# Patient Record
(demographics unavailable — no encounter records)

---

## 2024-11-26 NOTE — DISCUSSION/SUMMARY
[FreeTextEntry1] : 1.  Start hydrochlorothiazide 12.5 mg daily given his elevated blood pressure and edema.  Blood work in 2 weeks to ensure that his renal function is stable.  Continue Lasix at 20 mg every day.  2. Continue other current cardiac meds in doses as noted above for hypertension, atrial fibrillation, CHF, pulmonary hypertension, elevated [place] risk.   3. Continue anticoagulation for stroke prophylaxis for atrial fibrillation. 4.  No additional cardiac testing at this time. 5. Monitor BP at home, keep a log and bring to f/u. 6. Patient encouraged to work on diet to lose weight. Patient encouraged to work on a healthy diet high in lean protein, whole grains and vegetables, and lower in white flour and simple sugars. 7. Patient is encouraged to exercise at least 30 minutes a day everyday of the week. 8. Follow up with pulmonary. 9. We will not consider right and left heart catheterization as was planned to evaluate his shortness of breath and pulmonary hypertension as well as his CHF, given his preference to not do so.  10. Follow up here in 3 months. [EKG obtained to assist in diagnosis and management of assessed problem(s)] : EKG obtained to assist in diagnosis and management of assessed problem(s)

## 2024-11-26 NOTE — HISTORY OF PRESENT ILLNESS
[FreeTextEntry1] : Patient presents back to the office today concerned about higher blood pressures.  After stopping the hydrochlorothiazide and making other changes to his medications his blood pressures have been higher.  Recently ranging in the 130s to 140s over 70s to 80s.  He was seen by his nephrologist who told him to stop hydrochlorothiazide and at that time his blood pressure was low so he was actually told to lower his metoprolol.  His blood pressure started going up again so he increased back to 100 mg daily.  He also notes that his swelling has been a little worse since stopping hydrochlorothiazide.  He is not having issues with shortness of breath or other symptoms at all.  Patient denies chest pain, palpitations, orthopnea, presyncope, syncope.

## 2024-11-26 NOTE — ASSESSMENT
[FreeTextEntry1] : Echocardiogram April 22, 2019 that showed left ventricle normal size and function with ejection fraction of 55%. Mild concentric LVH. Moderately enlarged right ventricle with normal function. Mildly dilated left atrium. Mild mitral and moderate tricuspid regurgitation. Moderate pulmonary hypertension. Compared to prior study pulmonary pressures appear higher and right ventricle appears more dilated.  Carotid Doppler October 1, 2020 demonstrated mild plaque bilaterally with mild bilateral stenosis.  Echocardiogram June 8, 2021 demonstrated left ventricle borderline increased in size with normal function with ejection fraction of 55 to 60%. Mildly dilated left atrium, moderately dilated right atrium. Mild mitral and aortic regurgitation with moderate tricuspid regurgitation and trace pulmonic insufficiency. Mild dilatation of the ascending aorta. Mildly elevated pulmonary pressures. LV wall thickness is normal on this study.  Holter monitor June 11, 2021 showed a presenting rhythm atrial fibrillation with an average heart rate of 45 bpm, maximum 54, minimum 37 bpm. Longest pause noted was 2.9 seconds.  Echocardiogram May 17, 2022 demonstrated left ventricle normal size and function with ejection fraction of 60%. Moderately enlarged right ventricle with normal function. Mildly dilated left atrium with severely dilated right atrium. Mild mitral, moderate aortic, moderate tricuspid and trace pulmonic insufficiency. Mild dilatation of the ascending aorta. Pulmonary pressures appear normal on this study.  Nuclear stress test July 13, 2022 during which the patient initially exercised via a Yonathan protocol for 3 minutes and 10 seconds, totaling 4 METS. Peak heart rate achieved was 81 bpm, representing 57% of age-predicted maximum. Blood pressure response to exercise was normal. Given submaximal exercise achieved the study was converted to a pharmacologic study which was tolerated well. Blood pressure response to infusion was normal. EKG showed no evidence of ischemia. Evaluation of nuclear imaging showed a small fixed defect in the basal inferior wall concerning for possible infarct. No evidence of ischemia. Ejection fraction of 58%. Right ventricle is dilated.  Echocardiogram February 26, 2024 demonstrated left ventricle mildly dilated with function with ejection fraction of 55 to 60%.  Mildly enlarged right ventricle with normal function.  Mild left atrial dilatation, right atrium is moderately dilated.  Mild mitral, mild to moderate aortic, mild tricuspid regurgitation.  Ascending order mildly dilated to 4.3 cm.  Pulmonary pressures appear normal on the study.  EKG: Atrial fibrillation with ventricular pacing.  81-year-old man with a past medical history of chronic diastolic congestive heart failure with pulmonary hypertension, chronic atrial fibrillation status post pacemaker, hypertension, PATIENCE not treated who presents today for followup.  Patient presents back concerned about some higher blood pressures.  His blood pressure certainly a little bit higher than it was now off hydrochlorothiazide even with the increase in metoprolol and changes to his medication.  He does also have a little bit more edema.  I would like to try him on a lower dose of hydrochlorothiazide to see if he can tolerate it from his renal function.  He will have blood work in a couple of weeks to check that.  I would not make any other changes to his medication for today.  Interrogation of device shows atrial fibrillation with 100% ventricular pacing.  His EKG shows that as well.  He does not otherwise appear to be in acute heart failure.

## 2024-11-26 NOTE — PHYSICAL EXAM
[General Appearance - In No Acute Distress] : no acute distress [Normal Conjunctiva] : the conjunctiva exhibited no abnormalities [Normal Oral Mucosa] : normal oral mucosa [Normal Oropharynx] : normal oropharynx [Auscultation Breath Sounds / Voice Sounds] : lungs were clear to auscultation bilaterally [Normal Rate] : normal [Rhythm Regular] : regular [Normal S1] : normal S1 [Normal S2] : normal S2 [No Murmur] : no murmurs heard [Abdomen Soft] : soft [Abdomen Tenderness] : non-tender [Abnormal Walk] : normal gait [Nail Clubbing] : no clubbing of the fingernails [Cyanosis, Localized] : no localized cyanosis [Skin Color & Pigmentation] : normal skin color and pigmentation [Oriented To Time, Place, And Person] : oriented to person, place, and time [Affect] : the affect was normal [S3] : no S3 [FreeTextEntry1] : 1+ b/l pretibial edema

## 2024-12-18 NOTE — PHYSICAL EXAM
[Ankle Swelling (On Exam)] : present [Ankle Swelling Bilaterally] : bilaterally  [Ankle Swelling On The Left] : moderate [1+] : left foot dorsalis pedis 1+ [No Joint Swelling] : no joint swelling [] : normal gait [Normal Foot/Ankle] : Both lower extremities were exposed and visualized. Standing exam demonstrates normal foot posture and alignment. Hindfoot exam shows no hindfoot valgus or varus [Diminished Throughout Right Foot] : diminished sensation with monofilament testing throughout right foot [Diminished Throughout Left Foot] : diminished sensation with monofilament testing throughout left foot [Oriented To Time, Place, And Person] : oriented to person, place, and time [Impaired Insight] : insight and judgment were intact [Affect] : the affect was normal [Delayed in the Right Toes] : capillary refills normal in right toes [Delayed in the Left Toes] : capillary refills normal in the left toes [FreeTextEntry1] : Left hallux medial lateral nail border incurvation noted.  Edema and local erythema noted Left.    Nails 1-5 left right painful, thickened, discolored, dystrophic with subungual debris Right toenails 1-5 mildly incurvating without paroncyhia No open ulcerations or breaks in the integument bilateral

## 2024-12-18 NOTE — HISTORY OF PRESENT ILLNESS
[Sneakers] : rachel [FreeTextEntry1] : Patient complains of elongated ingrowing toenails difficult to cut on his own. He denies further concerns. Patient is on Eliquis. Patient also reports abnormal sensation to his feet such as numbness, intermittently and when he elevates his feet.  Patient complains of gout on right foot, took steroids for 3-6 days that was prescribed by PCP. Patient has improved with gout pain.   Update 12/18 Patient is complaining of ingrown toenail on his left big toenail. Patient states the pain is unbearable it hard to wear closed shoes. Patient states his appointment was next month due to being in so much pain he came in today. Patient would like Left PPNA procedure today.

## 2024-12-18 NOTE — REASON FOR VISIT
[Follow-Up Visit] : a follow-up visit for [Ingrown Nail] : ingrown nail [FreeTextEntry2] : left hallux med lat

## 2024-12-18 NOTE — ASSESSMENT
[FreeTextEntry1] : Discussed diagnosis and treatment with patient  Discussed etiology of symptoms patient is experiencing  s/p 9/30/24 Aseptic debridement of nails 1-5 bilateral reducing the length with a sterile nail clipper manually and reduced girth by power toby  Continue antifungal topical apply to bilateral feet twice a day  Discussed proper shoe gear with patient  Discussed the importance of alternating shoe gear every other day  Discussed the importance of appropriate moisture balance  -- 12/18/24 Discussed all risks, complications and benefits for procedure  Consent signed, in chart: Left hallux medial lateral partial nail avulsion with phenol matrixectomy  Skin and nail fold prepped with alcohol and the Left 1st digit local anesthesia administered with 1% plain lidocaine 5 cc  Partial nail avulsion performed with sterile English Anvil and hemostat  Sterile curette used at the base of the nail matrix Nail border was then flushed with normal saline  Phenol applied to the nail matrix and flushed with alcohol/wound cleanser  Dressed with SSD, DSD, and coban. Discussed daily wound care.  Keep dressing clean, dry and intact for next 48 hours, then apply antibiotic ointment for 1 week, and dry bandage daily  Discussed all signs and symptoms of local infection. Patient instructed to return to the office as soon as signs and symptoms arise.  Advised to elevate the left foot and stop blood thinners if bleeding occurs, and inform the office for urgent follow up. Patient verbalized understanding Patient to return to the office in 2 weeks for f/u L PPNA and routine

## 2025-01-03 NOTE — REASON FOR VISIT
[Follow-Up Visit] : a follow-up visit for [Ingrown Nail] : ingrown nail [FreeTextEntry2] : f/u left hallux med lat 10-Nov-2020 17:29

## 2025-01-03 NOTE — ASSESSMENT
[FreeTextEntry1] : Discussed diagnosis and treatment with patient  Discussed etiology of symptoms patient is experiencing  s/p 1/3/25 Aseptic debridement of nails 1-5 bilateral reducing the length with a sterile nail clipper manually and reduced girth by power toby  Continue antifungal topical apply to bilateral feet twice a day  Discussed proper shoe gear with patient  Discussed the importance of alternating shoe gear every other day  Discussed the importance of appropriate moisture balance  -- s/p Left hallux medial lateral partial nail avulsion with phenol matrixectomy 12/18/24 Healing slowly with persistent edema/erythema. No purulence. Applied betadine and bandaid--to be done daily. Continue daily warm foot soaks with Epsom salt and daily betadine Rx Cefdinir 300 mg po BID for 10 days Discussed all signs and symptoms of local infection. Patient instructed to return to the office as soon as signs and symptoms arise.  Patient to return to the office in 2 weeks for f/u L PPNA

## 2025-01-03 NOTE — PHYSICAL EXAM
[Ankle Swelling (On Exam)] : present [Ankle Swelling Bilaterally] : bilaterally  [Ankle Swelling On The Left] : moderate [1+] : left foot dorsalis pedis 1+ [No Joint Swelling] : no joint swelling [] : normal strength/tone [Normal Foot/Ankle] : Both lower extremities were exposed and visualized. Standing exam demonstrates normal foot posture and alignment. Hindfoot exam shows no hindfoot valgus or varus [Diminished Throughout Right Foot] : diminished sensation with monofilament testing throughout right foot [Diminished Throughout Left Foot] : diminished sensation with monofilament testing throughout left foot [Oriented To Time, Place, And Person] : oriented to person, place, and time [Impaired Insight] : insight and judgment were intact [Affect] : the affect was normal [General Appearance - Alert] : alert [General Appearance - In No Acute Distress] : in no acute distress [Delayed in the Right Toes] : capillary refills normal in right toes [Delayed in the Left Toes] : capillary refills normal in the left toes [FreeTextEntry1] : Left hallux medial lateral nail border partially avulsed.  Persistent edema and local erythema noted Left.   Nails 1-5 left right painful, thickened, discolored, dystrophic with subungual debris Right toenails 1-5 mildly incurvating without paroncyhia No open ulcerations or breaks in the integument bilateral

## 2025-01-03 NOTE — HISTORY OF PRESENT ILLNESS
[Sneakers] : rachel [FreeTextEntry1] : Patient complains of elongated ingrowing toenails difficult to cut on his own. He denies further concerns. Patient is on Eliquis. Patient also reports abnormal sensation to his feet such as numbness, intermittently and when he elevates his feet.  Patient complains of gout on right foot, took steroids for 3-6 days that was prescribed by PCP. Patient has improved with gout pain.   Update 01/03 Patient is present for follow up on ppna left hallux. Patient states he noticed two days after the procedure the toe was throbbing, painful and swollen. Patient was concerned for an infection he went to the hospital [facility] he was admitted for three hours, he states they did not do anything he walked out. Patient is present without dressings on. Patient denies pain, he states it feels sore on top of the nail.

## 2025-01-21 NOTE — REASON FOR VISIT
[Follow-Up Visit] : a follow-up visit for [Ingrown Nail] : ingrown nail [FreeTextEntry2] : f/u left hallux med lat ppna

## 2025-01-21 NOTE — PHYSICAL EXAM
[General Appearance - Alert] : alert [General Appearance - In No Acute Distress] : in no acute distress [Ankle Swelling (On Exam)] : present [Ankle Swelling Bilaterally] : bilaterally  [Ankle Swelling On The Left] : moderate [1+] : left foot dorsalis pedis 1+ [No Joint Swelling] : no joint swelling [] : normal strength/tone [Normal Foot/Ankle] : Both lower extremities were exposed and visualized. Standing exam demonstrates normal foot posture and alignment. Hindfoot exam shows no hindfoot valgus or varus [Diminished Throughout Right Foot] : diminished sensation with monofilament testing throughout right foot [Diminished Throughout Left Foot] : diminished sensation with monofilament testing throughout left foot [Oriented To Time, Place, And Person] : oriented to person, place, and time [Impaired Insight] : insight and judgment were intact [Affect] : the affect was normal [Delayed in the Right Toes] : capillary refills normal in right toes [Delayed in the Left Toes] : capillary refills normal in the left toes [FreeTextEntry1] : Left hallux medial lateral nail border partially avulsed.  Resolving edema and local erythema noted Left.   Nails 1-5 left right painful, thickened, discolored, dystrophic with subungual debris Right toenails 1-5 mildly incurvating without paroncyhia No open ulcerations or breaks in the integument bilateral

## 2025-01-21 NOTE — HISTORY OF PRESENT ILLNESS
[Sneakers] : rachel [FreeTextEntry1] : Patient complains of elongated ingrowing toenails difficult to cut on his own.  Patient is on Eliquis. Patient also reports abnormal sensation to his feet such as numbness, intermittently and when he elevates his feet. Patient complains of gout on right foot, took steroids for 3-6 days that was prescribed by PCP.  Update 1/21/25 Patient states denies pain on left hallux. States he has noticed improvement.

## 2025-01-21 NOTE — ASSESSMENT
[FreeTextEntry1] : Discussed diagnosis and treatment with patient  Discussed etiology of symptoms patient is experiencing  s/p Left hallux medial lateral partial nail avulsion with phenol matrixectomy 12/18/24 s/p 1/3/25 Aseptic debridement of nails 1-5 bilateral reducing the length with a sterile nail clipper manually and reduced girth by power toby  Aseptically cleaned left hallux b/l borders. No signs of infection. Resolving edema/erythema. Continue antifungal topical apply to bilateral feet twice a day  Discussed proper shoe gear with patient  Discussed the importance of alternating shoe gear every other day  Discussed the importance of appropriate moisture balance  Completed Cefdinir 300 mg po BID for 10 days Discussed all signs and symptoms of local infection. Patient instructed to return to the office as soon as signs and symptoms arise.  Patient to return to the office in 10 weeks from 1/3/25 for routine, or earlier upon worsening symptoms

## 2025-03-12 NOTE — ASSESSMENT
[FreeTextEntry1] : Discussed diagnosis and treatment with patient  Discussed etiology of symptoms patient is experiencing  s/p 12/18/24 Left hallux medial lateral PPNA Aseptic debridement of nails 1-5 bilateral reducing the length with a sterile nail clipper manually and reduced girth by power toby  Continue antifungal topical apply to bilateral feet twice a day  Discussed proper shoe gear with patient  Discussed the importance of alternating shoe gear every other day  Discussed the importance of appropriate moisture balance  Completed Cefdinir 300 mg po BID for 10 days Discussed all signs and symptoms of local infection. Patient instructed to return to the office as soon as signs and symptoms arise.  Patient to return to the office in 10 weeks

## 2025-03-12 NOTE — HISTORY OF PRESENT ILLNESS
[Sneakers] : rachel [FreeTextEntry1] : Presents in the [facility] for foot care. Patient complains of elongated and thick toenails difficult to cut on his own.  Patient is on Eliquis. Patient also reports abnormal sensation to his feet such as numbness, intermittently and when he elevates his feet. Denies acute pain to the previous ingrown procedure sites.

## 2025-03-12 NOTE — PHYSICAL EXAM
[General Appearance - In No Acute Distress] : in no acute distress [General Appearance - Alert] : alert [Ankle Swelling (On Exam)] : present [Ankle Swelling Bilaterally] : bilaterally  [1+] : left foot dorsalis pedis 1+ [No Joint Swelling] : no joint swelling [] : normal strength/tone [Normal Foot/Ankle] : Both lower extremities were exposed and visualized. Standing exam demonstrates normal foot posture and alignment. Hindfoot exam shows no hindfoot valgus or varus [Diminished Throughout Right Foot] : diminished sensation with monofilament testing throughout right foot [Diminished Throughout Left Foot] : diminished sensation with monofilament testing throughout left foot [Oriented To Time, Place, And Person] : oriented to person, place, and time [Impaired Insight] : insight and judgment were intact [Affect] : the affect was normal [Ankle Swelling On The Right] : mild [Delayed in the Right Toes] : capillary refills normal in right toes [Delayed in the Left Toes] : capillary refills normal in the left toes [FreeTextEntry3] : diminished pedal hair, no digital ischemia [FreeTextEntry1] : Left hallux medial lateral nail border partially avulsed.  Nails 1-5 left right painful, thickened, discolored, dystrophic with subungual debris Right toenails 1-5 mildly incurvating without paroncyhia No open ulcerations or breaks in the integument bilateral

## 2025-03-25 NOTE — ASSESSMENT
[FreeTextEntry1] : Echocardiogram April 22, 2019 that showed left ventricle normal size and function with ejection fraction of 55%. Mild concentric LVH. Moderately enlarged right ventricle with normal function. Mildly dilated left atrium. Mild mitral and moderate tricuspid regurgitation. Moderate pulmonary hypertension. Compared to prior study pulmonary pressures appear higher and right ventricle appears more dilated.  Carotid Doppler October 1, 2020 demonstrated mild plaque bilaterally with mild bilateral stenosis.  Echocardiogram June 8, 2021 demonstrated left ventricle borderline increased in size with normal function with ejection fraction of 55 to 60%. Mildly dilated left atrium, moderately dilated right atrium. Mild mitral and aortic regurgitation with moderate tricuspid regurgitation and trace pulmonic insufficiency. Mild dilatation of the ascending aorta. Mildly elevated pulmonary pressures. LV wall thickness is normal on this study.  Holter monitor June 11, 2021 showed a presenting rhythm atrial fibrillation with an average heart rate of 45 bpm, maximum 54, minimum 37 bpm. Longest pause noted was 2.9 seconds.  Echocardiogram May 17, 2022 demonstrated left ventricle normal size and function with ejection fraction of 60%. Moderately enlarged right ventricle with normal function. Mildly dilated left atrium with severely dilated right atrium. Mild mitral, moderate aortic, moderate tricuspid and trace pulmonic insufficiency. Mild dilatation of the ascending aorta. Pulmonary pressures appear normal on this study.  Nuclear stress test July 13, 2022 during which the patient initially exercised via a Yonathan protocol for 3 minutes and 10 seconds, totaling 4 METS. Peak heart rate achieved was 81 bpm, representing 57% of age-predicted maximum. Blood pressure response to exercise was normal. Given submaximal exercise achieved the study was converted to a pharmacologic study which was tolerated well. Blood pressure response to infusion was normal. EKG showed no evidence of ischemia. Evaluation of nuclear imaging showed a small fixed defect in the basal inferior wall concerning for possible infarct. No evidence of ischemia. Ejection fraction of 58%. Right ventricle is dilated.  Echocardiogram February 26, 2024 demonstrated left ventricle mildly dilated with function with ejection fraction of 55 to 60%.  Mildly enlarged right ventricle with normal function.  Mild left atrial dilatation, right atrium is moderately dilated.  Mild mitral, mild to moderate aortic, mild tricuspid regurgitation.  Ascending order mildly dilated to 4.3 cm.  Pulmonary pressures appear normal on the study.  EKG: Atrial fibrillation with ventricular pacing.  81-year-old man with a past medical history of chronic diastolic congestive heart failure with pulmonary hypertension, chronic atrial fibrillation status post pacemaker, hypertension, PATIENCE not treated who presents today for followup.  Patient presents back having tolerated the addition of hydrochlorothiazide.  Blood pressures continue to be somewhat elevated.  He has not had repeat blood work done and this certainly needs to be done given his known renal insufficiency.  I have recommended not making any changes to his medication for today but to have blood work done first.  If his renal function and electrolytes are stable will increase hydrochlorothiazide to 25 mg daily.  If not we may need to discontinue it and try something else or just add something else.  He will follow-up with nephrology as well.  No evidence of acute heart failure and his edema is controlled.  EKG today shows ventricular pacing and atrial fibrillation.  He is tolerating Eliquis without a problem.

## 2025-03-25 NOTE — HISTORY OF PRESENT ILLNESS
[FreeTextEntry1] : Patient presents back to the office today feeling physically well.  He is able to do all of his normal daily activities and some exercise without any symptoms or limitations.  Blood pressures have been in the 120s to 140s over 70s to 80s.  He tolerated the addition of hydrochlorothiazide without a problem but has not had any blood work done.  Patient denies chest pain, shortness of breath, palpitations, orthopnea, presyncope, syncope.

## 2025-03-25 NOTE — DISCUSSION/SUMMARY
[FreeTextEntry1] : 1.  He will have blood work done.  If creatinine and electrolytes are stable will increase hydrochlorothiazide to 25 mg daily.  If not we will consider if we need to stop hydrochlorothiazide and/or add something else..  Continue Lasix at 20 mg every day.  2. Continue other current cardiac meds in doses as noted above for hypertension, atrial fibrillation, CHF, pulmonary hypertension, elevated Doucette risk.   3. Continue anticoagulation for stroke prophylaxis for atrial fibrillation. 4.  No additional cardiac testing at this time. 5. Monitor BP at home, keep a log and bring to f/u. 6. Patient encouraged to work on diet to lose weight. Patient encouraged to work on a healthy diet high in lean protein, whole grains and vegetables, and lower in white flour and simple sugars. 7. Patient is encouraged to exercise at least 30 minutes a day everyday of the week. 8. Follow up with pulmonary and renal. 9. We will not consider right and left heart catheterization as was planned to evaluate his shortness of breath and pulmonary hypertension as well as his CHF, given his preference to not do so.  10. Follow up here in 3 months. [EKG obtained to assist in diagnosis and management of assessed problem(s)] : EKG obtained to assist in diagnosis and management of assessed problem(s)

## 2025-05-21 NOTE — ASSESSMENT
[FreeTextEntry1] : Discussed diagnosis and treatment with patient  Discussed etiology of symptoms patient is experiencing  s/p 12/18/24 Left hallux medial lateral PPNA Aseptic debridement of nails 1-5 bilateral reducing the length with a sterile nail clipper manually and reduced girth by power toby  Continue antifungal topical apply to bilateral feet twice a day  Discussed proper shoe gear with patient  Discussed the importance of alternating shoe gear every other day  Discussed the importance of appropriate moisture balance  Discussed all signs and symptoms of local infection. Patient instructed to return to the office as soon as signs and symptoms arise.  Patient to return to the office in 10 weeks

## 2025-05-21 NOTE — PHYSICAL EXAM
[General Appearance - Alert] : alert [General Appearance - In No Acute Distress] : in no acute distress [Ankle Swelling (On Exam)] : present [Ankle Swelling Bilaterally] : bilaterally  [Ankle Swelling On The Right] : mild [1+] : left foot dorsalis pedis 1+ [No Joint Swelling] : no joint swelling [] : normal strength/tone [Normal Foot/Ankle] : Both lower extremities were exposed and visualized. Standing exam demonstrates normal foot posture and alignment. Hindfoot exam shows no hindfoot valgus or varus [Diminished Throughout Right Foot] : diminished sensation with monofilament testing throughout right foot [Diminished Throughout Left Foot] : diminished sensation with monofilament testing throughout left foot [Oriented To Time, Place, And Person] : oriented to person, place, and time [Impaired Insight] : insight and judgment were intact [Affect] : the affect was normal [Delayed in the Right Toes] : capillary refills normal in right toes [Delayed in the Left Toes] : capillary refills normal in the left toes [FreeTextEntry3] : diminished pedal hair, no digital ischemia [FreeTextEntry1] : Nails 1-5 left right painful, thickened, discolored, dystrophic with subungual debris No open ulcerations or breaks in the integument bilateral

## 2025-05-21 NOTE — HISTORY OF PRESENT ILLNESS
[Sneakers] : rachel [FreeTextEntry1] : Presents in the [facility] for foot care. Patient complains of elongated and thick toenails difficult to cut on his own.  Patient is on Eliquis. Patient also reports abnormal sensation to his feet such as numbness, intermittently and when he elevates his feet. Denies acute pain to the previous ingrown procedure sites but believes the tips of the big toenails are becoming increasingly painful as they grow out.

## 2025-07-30 NOTE — HISTORY OF PRESENT ILLNESS
[Sneakers] : rachel [FreeTextEntry1] : Presents in the [facility] for foot care. Patient complains of elongated and thick toenails difficult to cut on his own.  Patient is on Eliquis. Patient also reports abnormal sensation to his feet such as numbness, intermittently and when he elevates his feet.